# Patient Record
Sex: FEMALE | Race: WHITE | Employment: FULL TIME | URBAN - METROPOLITAN AREA
[De-identification: names, ages, dates, MRNs, and addresses within clinical notes are randomized per-mention and may not be internally consistent; named-entity substitution may affect disease eponyms.]

---

## 2017-01-05 ENCOUNTER — THERAPY VISIT (OUTPATIENT)
Dept: PHYSICAL THERAPY | Facility: CLINIC | Age: 52
End: 2017-01-05
Payer: COMMERCIAL

## 2017-01-05 DIAGNOSIS — M25.551 HIP PAIN, RIGHT: Primary | ICD-10-CM

## 2017-01-05 PROCEDURE — 97112 NEUROMUSCULAR REEDUCATION: CPT | Mod: GP | Performed by: PHYSICAL THERAPIST

## 2017-01-05 PROCEDURE — 97161 PT EVAL LOW COMPLEX 20 MIN: CPT | Mod: GP | Performed by: PHYSICAL THERAPIST

## 2017-01-05 PROCEDURE — 97110 THERAPEUTIC EXERCISES: CPT | Mod: GP | Performed by: PHYSICAL THERAPIST

## 2017-01-05 NOTE — PROGRESS NOTES
[facility] Initial Evaluation    Subjective:    Nalia Fajardo is a 51 year old female with a lumbar condition.      This is a chronic condition  In 2016, pt rode 8,246 miles cycling.  Pt developed L anterior hip pain at the end of 2015 likely due to high amounts of cycling.  Pt went through PT at the end of 2015 which helped enough to the point where pt was no longer experiencing symptoms.  Over time, pt has cut back on her exercises that were working for her, as she has increased her mileage cycling.  Pt started to notice in November of 2016, felt like her hip was going to give way almost.  Pt then started doing some stretching to try and work out the pain, was doing yoga to try and help.  Pt feels that the stretching helps but her overall symptoms are not resolving.  Pt notes that her hip is giving way approximately 1x/week..    Patient reports pain:  SI joint right and SI joint left (R anterior hip, L>R low back pain).    Pain is described as sharp and aching and is intermittent and reported as 2/10 and 3/10.   Pain is the same all the time.  Symptoms are exacerbated by walking (sitting cross legged) and relieved by rest.  Since onset symptoms are gradually improving.  Special tests:  X-ray (no results as of yet).      General health as reported by patient is excellent.  Pertinent medical history includes:  None.                                          Objective:      Gait:    Gait Type:  Normal         Flexibility/Screens:   Positive screens:  Hip and Lumbar                                                     Hip Evaluation  Hip PROM:    Flexion: Left: wnl   Right: wnl  Extension: Left: wnl   Right: wnl      Internal Rotation: Left: wnl    Right: mod limitation +  External Rotation: Left: wnl    Right: min limitation               Hip Strength:    Flexion:   Left: 5/5   Pain:  Right: 4+/5   +  Pain:                    Extension:  Left: 5/5  Pain:Right: 5-/5    Pain:    Abduction:  Left: 5/5      Pain:Right: 4/5    Pain:                  Hip Special Testing:      Left hip negative for the following special tests:  Romaine; Fadir/Labrum or SLR   Right hip positive for the following special tests:  Romaine and Fadir/LabrumRight hip negative for the following special tests:  SLR    Hip Palpation:      Left hip tenderness not present at:  hip flexors  Right hip tenderness present at:  hip flexors  Functional Testing:          Quad:    Single leg squat:   Left:    Excessive anterior knee excursion  Right:   Moderate loss of control, femoral IR and excessive anterior knee excursion    Bilateral leg squat:  Mild loss of control, fermoral IR and excessive anterior knee excursion                      Mani Lumbar Evaluation    Posture:  Sitting: good  Standing: good  Lordosis: WNL  Lateral Shift: no  Correction of Posture: no effect    Movement Loss:  Flexion (Flex): nil  Extension (EXT): mod and pain  Side [place] R (SG R): min  Side Glide L (SG L): min  Test Movements:  FIS:   Repeat FIS: During: no effect  After: no effect  Mechanical Response: no effect      EIL:   Repeat EIL: During: produces  After: no worse  Mechanical Response: no effect        Conclusion: other                                         ROS    Assessment/Plan:      Patient is a 51 year old female with right side hip complaints.    Patient has the following significant findings with corresponding treatment plan.                Diagnosis 1:  R anterior hip pain      Pain -  hot/cold therapy, manual therapy, self management, education, directional preference exercise and home program  Decreased ROM/flexibility - manual therapy and therapeutic exercise  Decreased strength - therapeutic exercise and therapeutic activities  Impaired muscle performance - neuro re-education  Decreased function - therapeutic activities    Therapy Evaluation Codes:   1) History comprised of:   Personal factors that impact the plan of care:      None.    Comorbidity  factors that impact the plan of care are:      None.     Medications impacting care: None.  2) Examination of Body Systems comprised of:   Body structures and functions that impact the plan of care:      Hip and Lumbar spine.   Activity limitations that impact the plan of care are:      Sitting and Sports.  3) Clinical presentation characteristics are:   Stable/Uncomplicated.  4) Decision-Making    Low complexity using standardized patient assessment instrument and/or measureable assessment of functional outcome.  Cumulative Therapy Evaluation is: Low complexity.    Previous and current functional limitations:  (See Goal Flow Sheet for this information)    Short term and Long term goals: (See Goal Flow Sheet for this information)     Communication ability:  Patient appears to be able to clearly communicate and understand verbal and written communication and follow directions correctly.  Treatment Explanation - The following has been discussed with the patient:   RX ordered/plan of care  Anticipated outcomes  Possible risks and side effects  This patient would benefit from PT intervention to resume normal activities.   Rehab potential is good.    Frequency:  1 X week, once daily  Duration:  for 6 weeks  Discharge Plan:  Achieve all LTG.  Independent in home treatment program.  Reach maximal therapeutic benefit.    Please refer to the daily flowsheet for treatment today, total treatment time and time spent performing 1:1 timed codes.
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
## 2017-01-06 NOTE — PROGRESS NOTES
Subjective:                                         Medical allergies: yes.  Other surgeries include:  Other and orthopedic surgery (knee).  Current medications:  Anti-inflammatory and muscle relaxants.  Current occupation is .    Primary job tasks include:  Prolonged sitting and other (Computer work).                              Objective:    System    Physical Exam    General     ROS    Assessment/Plan:

## 2017-01-13 ENCOUNTER — THERAPY VISIT (OUTPATIENT)
Dept: PHYSICAL THERAPY | Facility: CLINIC | Age: 52
End: 2017-01-13
Payer: COMMERCIAL

## 2017-01-13 DIAGNOSIS — M25.551 HIP PAIN, RIGHT: Primary | ICD-10-CM

## 2017-01-13 PROCEDURE — 97112 NEUROMUSCULAR REEDUCATION: CPT | Mod: GP | Performed by: PHYSICAL THERAPIST

## 2017-01-13 PROCEDURE — 97110 THERAPEUTIC EXERCISES: CPT | Mod: GP | Performed by: PHYSICAL THERAPIST

## 2017-01-31 ENCOUNTER — THERAPY VISIT (OUTPATIENT)
Dept: PHYSICAL THERAPY | Facility: CLINIC | Age: 52
End: 2017-01-31
Payer: COMMERCIAL

## 2017-01-31 DIAGNOSIS — M25.551 HIP PAIN, RIGHT: Primary | ICD-10-CM

## 2017-01-31 PROCEDURE — 97112 NEUROMUSCULAR REEDUCATION: CPT | Mod: GP | Performed by: PHYSICAL THERAPIST

## 2017-01-31 PROCEDURE — 97110 THERAPEUTIC EXERCISES: CPT | Mod: GP | Performed by: PHYSICAL THERAPIST

## 2017-02-23 ENCOUNTER — THERAPY VISIT (OUTPATIENT)
Dept: PHYSICAL THERAPY | Facility: CLINIC | Age: 52
End: 2017-02-23
Payer: COMMERCIAL

## 2017-02-23 DIAGNOSIS — M25.551 HIP PAIN, RIGHT: ICD-10-CM

## 2017-02-23 PROCEDURE — 97112 NEUROMUSCULAR REEDUCATION: CPT | Mod: GP | Performed by: PHYSICAL THERAPIST

## 2017-02-23 PROCEDURE — 97110 THERAPEUTIC EXERCISES: CPT | Mod: GP | Performed by: PHYSICAL THERAPIST

## 2017-03-21 PROBLEM — M25.551 HIP PAIN, RIGHT: Status: RESOLVED | Noted: 2017-01-05 | Resolved: 2017-03-21

## 2017-03-21 NOTE — PROGRESS NOTES
Discharge Note    Progress reporting period is from initial eval to Feb 23, 2017.     Naila failed to return for next follow up visit and current status is unknown.  Please see information below for last relevant information on current status.  Patient seen for 4 visits.  SUBJECTIVE  Subjective changes noted by patient:  Pt was sick for last two weeks, pt was also on vacation in [place], but participated in a panel discussion.  .  Current pain level is  .     Previous pain level was  2/10.   Changes in function:  Yes (See Goal flowsheet attached for changes in current functional level)  Adverse reaction to treatment or activity: None    OBJECTIVE  Changes noted in objective findings: R glut med 5-/5, R glut max 5-/5.  improved functional control.  TROM: ext 75%     ASSESSMENT/PLAN  Diagnosis: R anterior hip pain   DIAGP:  The encounter diagnosis was Hip pain, right.  STG/LTGs have been met or progress has been made towards goals:  Yes, please see goal flowsheet for most current information  Assessment of Progress: current status is unknown.    Last current status: Pt is progressing well   Self Management Plans:  HEP  I have re-evaluated this patient and find that the nature, scope, duration and intensity of the therapy is appropriate for the medical condition of the patient.  Naila continues to require the following intervention to meet STG and LTG's:  HEP.    Recommendations:  Discharge with current home program.  Patient to follow up with MD as needed.    Please refer to the daily flowsheet for treatment today, total treatment time and time spent performing 1:1 timed codes.

## 2017-05-31 ENCOUNTER — THERAPY VISIT (OUTPATIENT)
Dept: PHYSICAL THERAPY | Facility: CLINIC | Age: 52
End: 2017-05-31
Payer: COMMERCIAL

## 2017-05-31 DIAGNOSIS — M25.512 SHOULDER PAIN, LEFT: Primary | ICD-10-CM

## 2017-05-31 PROCEDURE — 97161 PT EVAL LOW COMPLEX 20 MIN: CPT | Mod: GP | Performed by: PHYSICAL THERAPIST

## 2017-05-31 PROCEDURE — 97110 THERAPEUTIC EXERCISES: CPT | Mod: GP | Performed by: PHYSICAL THERAPIST
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
## 2017-06-07 ENCOUNTER — THERAPY VISIT (OUTPATIENT)
Dept: PHYSICAL THERAPY | Facility: CLINIC | Age: 52
End: 2017-06-07
Payer: COMMERCIAL

## 2017-06-07 DIAGNOSIS — M25.512 SHOULDER PAIN, LEFT: ICD-10-CM

## 2017-06-07 PROCEDURE — 97110 THERAPEUTIC EXERCISES: CPT | Mod: GP | Performed by: PHYSICAL THERAPIST

## 2017-06-07 PROCEDURE — 97112 NEUROMUSCULAR REEDUCATION: CPT | Mod: GP | Performed by: PHYSICAL THERAPIST

## 2017-06-21 ENCOUNTER — THERAPY VISIT (OUTPATIENT)
Dept: PHYSICAL THERAPY | Facility: CLINIC | Age: 52
End: 2017-06-21
Payer: COMMERCIAL

## 2017-06-21 DIAGNOSIS — M25.512 SHOULDER PAIN, LEFT: ICD-10-CM

## 2017-06-21 PROCEDURE — 97112 NEUROMUSCULAR REEDUCATION: CPT | Mod: GP | Performed by: PHYSICAL THERAPIST

## 2017-06-21 PROCEDURE — 97110 THERAPEUTIC EXERCISES: CPT | Mod: GP | Performed by: PHYSICAL THERAPIST

## 2017-10-11 PROBLEM — M25.512 SHOULDER PAIN, LEFT: Status: RESOLVED | Noted: 2017-05-31 | Resolved: 2017-10-11

## 2017-10-11 NOTE — PROGRESS NOTES
Discharge Note    Progress reporting period is from initial eval to Jun 21, 2017.     Naila failed to return for next follow up visit and current status is unknown.  Please see information below for last relevant information on current status.  Patient seen for 3 visits.  SUBJECTIVE  Subjective changes noted by patient:  Pt is doing well with riding, notes that she does stay sitting more often.  Pt has been working very hard on her hip/low back exercises, however has stlll had R gluteal/posterior thigh pain.  L shoulder pain seems to come and go at this point, however when she reaches accross her body  .  Current pain level is  .     Previous pain level was  5/10.   Changes in function:  Yes (See Goal flowsheet attached for changes in current functional level)  Adverse reaction to treatment or activity: None    OBJECTIVE  Changes noted in objective findings: R hip adductor tenderness/tightness.  L shoulder: mild + impingement, pain with MMT abd, all others normal.      ASSESSMENT/PLAN  Diagnosis: L shoulder pain   DIAGP:  The encounter diagnosis was Shoulder pain, left.  STG/LTGs have been met or progress has been made towards goals:  Yes, please see goal flowsheet for most current information  Assessment of Progress: current status is unknown.    Last current status: Pt is progressing as expected   Self Management Plans:  HEP  I have re-evaluated this patient and find that the nature, scope, duration and intensity of the therapy is appropriate for the medical condition of the patient.  Naila continues to require the following intervention to meet STG and LTG's:  HEP.    Recommendations:  Discharge with current home program.  Patient to follow up with MD as needed.    Please refer to the daily flowsheet for treatment today, total treatment time and time spent performing 1:1 timed codes.

## 2018-03-16 ENCOUNTER — THERAPY VISIT (OUTPATIENT)
Dept: PHYSICAL THERAPY | Facility: CLINIC | Age: 53
End: 2018-03-16
Payer: COMMERCIAL

## 2018-03-16 DIAGNOSIS — M25.551 HIP PAIN, RIGHT: Primary | ICD-10-CM

## 2018-03-16 PROCEDURE — 97161 PT EVAL LOW COMPLEX 20 MIN: CPT | Mod: GP | Performed by: PHYSICAL THERAPIST

## 2018-03-16 PROCEDURE — 97110 THERAPEUTIC EXERCISES: CPT | Mod: GP | Performed by: PHYSICAL THERAPIST

## 2018-03-16 NOTE — PROGRESS NOTES
[facility] Initial Evaluation  Subjective:  Patient is a 52 year old female presenting with rehab back hpi.   Naila Fajardo is a 52 year old female with a lumbar condition.      This is a chronic condition  First aggravation of R hip flexor in Nov 2016, and has had recurrent episodes off and on since that time.  Pt has continued biking, rode a lot in Nov-Dec 2017 and then noticed a flare up of her R hip in Jan of 2018 again.  Pt also had the flu in January for three weeks, but had taken time off from riding during that time, and developed her R hip pain again.  Pt does Yoga 2x/week which seems to help.  Pt describes that the pain seems to shift around a little bit.  More pain with walking, especially when first getting up out of a chair.  Pt hasn't changed her activity, has continued exercising, but doesn't have pain as she is doing her activity.  .    Site of Pain: R anterior hip, R lateral thigh, R groin, R glute.  Radiates to:  Gluteals right.  Pain is described as aching and is intermittent and reported as 2/10.   Pain is the same all the time.  Exacerbated by: sometimes with walking - very inconsistent however. and relieved by activity/movement and rest.  Since onset symptoms are gradually improving.  Special testing: no imaging.      General health as reported by patient is excellent.  Pertinent medical history includes:  None.      Current medications:  Anti-inflammatory.  Current occupation is   .  Patient is working in normal job without restrictions.      Barriers include:  None as reported by the patient.    Red flags:  None as reported by the patient.                        Objective:  System                                           Hip Evaluation  Hip PROM:    Flexion: Left: wnl   Right: min loss erp  Extension: Left: min loss   Right: min loss      Internal Rotation: Left: wnl    Right: major loss erp  External Rotation: Left:   Right: mod loss              Hip  Strength:    Flexion:   Left: 5/5   Pain:  Right: 4+/5   Pain:                    Extension:  Left: 4+/5  Pain:Right: 4/5    Pain:    Abduction:  Left: 5/5     Pain:Right: 5/5    Pain:                                   [place] Lumbar Evaluation    Posture:  Sitting: fair  Standing: fair  Lordosis: Reduced  Lateral Shift: no  Correction of Posture: no effect    Movement Loss:  Flexion (Flex): nil  Extension (EXT): mod  Side Glide R (SG R): min  Side Glide L (SG L): min  Test Movements:        EIL: During: produces  After: no worse  Mechanical Response: no effect  Repeat EIL: During: produces  After: no worse  Mechanical Response: no effect        Conclusion: other  Principle of Treatment:          Other: hip - repeated hip extension - decrease/better                                       ROS    Assessment/Plan:    Patient is a 52 year old female with right side hip complaints.    Patient has the following significant findings with corresponding treatment plan.                Diagnosis 1:  R hip pain  2    Pain -  hot/cold therapy, manual therapy, self management, education, directional preference exercise and home program  Decreased ROM/flexibility - manual therapy and therapeutic exercise  Decreased strength - therapeutic exercise and therapeutic activities  Impaired muscle performance - neuro re-education  Decreased function - therapeutic activities    Therapy Evaluation Codes:   1) History comprised of:   Personal factors that impact the plan of care:      Gender, Past/current experiences and Profession.    Comorbidity factors that impact the plan of care are:      None.     Medications impacting care: Anti-inflammatory.  2) Examination of Body Systems comprised of:   Body structures and functions that impact the plan of care:      Hip.   Activity limitations that impact the plan of care are:      Sitting and Walking.  3) Clinical presentation characteristics are:   Evolving/Changing.  4) Decision-Making    Low  complexity using standardized patient assessment instrument and/or measureable assessment of functional outcome.  Cumulative Therapy Evaluation is: Low complexity.    Previous and current functional limitations:  (See Goal Flow Sheet for this information)    Short term and Long term goals: (See Goal Flow Sheet for this information)     Communication ability:  Patient appears to be able to clearly communicate and understand verbal and written communication and follow directions correctly.  Treatment Explanation - The following has been discussed with the patient:   RX ordered/plan of care  Anticipated outcomes  Possible risks and side effects  This patient would benefit from PT intervention to resume normal activities.   Rehab potential is good.    Frequency:  1 X week, once daily  Duration:  for 6 weeks  Discharge Plan:  Achieve all LTG.  Independent in home treatment program.  Reach maximal therapeutic benefit.    Please refer to the daily flowsheet for treatment today, total treatment time and time spent performing 1:1 timed codes.

## 2018-03-16 NOTE — LETTER
[facility] [place]  [address]   [address]  [place] [address]  [phone]    2018    Re: Naila Fajardo   :   1965  MRN:  [id number]   REFERRING PHYSICIAN:   Brenda Zafar    [facility] [place]    Date of Initial Evaluation:  3/16/2018  Visits:  Rxs Used: 1  Reason for Referral:  Hip pain, right    EVALUATION SUMMARY    [facility] Evaluation  Subjective:  Patient is a 52 year old female presenting with rehab back hpi.   Naila Faajrdo is a 52 year old female with a lumbar condition.      This is a chronic condition  First aggravation of R hip flexor in 2016, and has had recurrent episodes off and on since that time.  Pt has continued biking, rode a lot in Nov-Dec 2017 and then noticed a flare up of her R hip in  again.  Pt also had the flu in January for three weeks, but had taken time off from riding during that time, and developed her R hip pain again.  Pt does Yoga 2x/week which seems to help.  Pt describes that the pain seems to shift around a little bit.  More pain with walking, especially when first getting up out of a chair.  Pt hasn't changed her activity, has continued exercising, but doesn't have pain as she is doing her activity.  .    Site of Pain: R anterior hip, R lateral thigh, R groin, R glute.  Radiates to:  Gluteals right.  Pain is described as aching and is intermittent and reported as 2/10.   Pain is the same all the time.  Exacerbated by: sometimes with walking - very inconsistent however. and relieved by activity/movement and rest.  Since onset symptoms are gradually improving.  Special testing: no imaging.      General health as reported by patient is excellent.  Pertinent medical history includes:  None.      Current medications:  Anti-inflammatory.  Current occupation is     Patient is working in normal job without restrictions.    Barriers include:  None as  reported by the patient.  Red flags:  None as reported by the patient.  Objective:  System  Hip Evaluation  Hip PROM:    Flexion: Left: wnl   Right: min loss erp  Extension: Left: min loss   Right: min loss  Internal Rotation: Left: wnl    Right: major loss erp  External Rotation: Left:   Right: mod loss  Hip Strength:    Flexion:   Left: 5/5   Pain:  Right: 4+/5   Pain:               Extension:  Left: 4+/5  Pain:Right: 4/5    Pain:    Abduction:  Left: 5/5     Pain:Right: 5/5    Pain:            Re: Naila Fajardo   :   1965      [place] Lumbar Evaluation  Posture:  Sitting: fair  Standing: fair  Lordosis: Reduced  Lateral Shift: no  Correction of Posture: no effect  Movement Loss:  Flexion (Flex): nil  Extension (EXT): mod  Side Glide R (SG R): min  Side Glide L (SG L): min  Test Movements:  EIL: During: produces  After: no worse  Mechanical Response: no effect  Repeat EIL: During: produces  After: no worse  Mechanical Response: no effect  Conclusion: other  Principle of Treatment:  Other: hip - repeated hip extension - decrease/better     Assessment/Plan:    Patient is a 52 year old female with right side hip complaints.    Patient has the following significant findings with corresponding treatment plan.                Diagnosis 1:  R hip pain  2    Pain -  hot/cold therapy, manual therapy, self management, education, directional preference exercise and home program  Decreased ROM/flexibility - manual therapy and therapeutic exercise  Decreased strength - therapeutic exercise and therapeutic activities  Impaired muscle performance - neuro re-education  Decreased function - therapeutic activities    Therapy Evaluation Codes:   1) History comprised of:   Personal factors that impact the plan of care:      Gender, Past/current experiences and Profession.    Comorbidity factors that impact the plan of care are:      None.     Medications impacting care: Anti-inflammatory.  2) Examination of Body Systems  comprised of:   Body structures and functions that impact the plan of care:      Hip.   Activity limitations that impact the plan of care are:      Sitting and Walking.  3) Clinical presentation characteristics are:   Evolving/Changing.  4) Decision-Making    Low complexity using standardized patient assessment instrument and/or measureable assessment of functional outcome.  Cumulative Therapy Evaluation is: Low complexity.    Previous and current functional limitations:  (See Goal Flow Sheet for this information)    Short term and Long term goals: (See Goal Flow Sheet for this information)     Communication ability:  Patient appears to be able to clearly communicate and understand verbal and written communication and follow directions correctly.    Re: Naila Fajardo   :   1965      Treatment Explanation - The following has been discussed with the patient:   RX ordered/plan of care  Anticipated outcomes  Possible risks and side effects  This patient would benefit from PT intervention to resume normal activities.   Rehab potential is good.    Frequency:  1 X week, once daily  Duration:  for 6 weeks  Discharge Plan:  Achieve all LTG.  Independent in home treatment program.  Reach maximal therapeutic benefit.    Thank you for your referral.    INQUIRIES  Therapist:  Andreas Strong PT  [facility] [place]  [address] [place] [id number]  Phone: [phone]  Fax: [phone]

## 2018-09-27 PROBLEM — M25.551 HIP PAIN, RIGHT: Status: RESOLVED | Noted: 2018-03-16 | Resolved: 2018-09-27
